# Patient Record
(demographics unavailable — no encounter records)

---

## 2025-04-21 NOTE — HISTORY OF PRESENT ILLNESS
[FreeTextEntry1] : Naty Rolle is a 21 year old female with pmh of  neurofibromatosis, optic glioma, hydrocephalus s/p   shunt in 2022  ,scoliosis with repair in  2020  is here to establish primary care . patient overall feels well and no complaints pt with pituitary adenoma on cabergoline  Denies chest pain, palpitations, diaphoresis, vision changes, HA, dizziness, syncope, cough, wheezing, SOB/SAPP, edema, fever, chills, infection.

## 2025-04-21 NOTE — PHYSICAL EXAM
